# Patient Record
Sex: FEMALE | Race: BLACK OR AFRICAN AMERICAN | Employment: UNEMPLOYED | URBAN - METROPOLITAN AREA
[De-identification: names, ages, dates, MRNs, and addresses within clinical notes are randomized per-mention and may not be internally consistent; named-entity substitution may affect disease eponyms.]

---

## 2017-09-27 ENCOUNTER — HOSPITAL ENCOUNTER (OUTPATIENT)
Dept: PHYSICAL THERAPY | Age: 42
Discharge: HOME | End: 2017-09-27
Payer: COMMERCIAL

## 2017-09-27 PROCEDURE — 97161 PT EVAL LOW COMPLEX 20 MIN: CPT

## 2017-09-27 PROCEDURE — 97110 THERAPEUTIC EXERCISES: CPT

## 2017-09-27 PROCEDURE — 97014 ELECTRIC STIMULATION THERAPY: CPT

## 2017-09-27 NOTE — PROGRESS NOTES
[address] [facility] AT [address], [address] [place] [address]  Phone: [phone]   [phone]  PLAN OF CARE / [address] [place] [address] PHYSICAL THERAPY SERVICES  Patient Name: Vance Bazan : 1975   Medical   Diagnosis: Neck pain [M54.2]  Back pain [M54.9] Treatment Diagnosis: Neck pain [M54.2]  Back pain [M54.9]   Onset Date: 9/10/17     Referral Source: Unknown, Provider, MD Start of Care [facility]): 2017   Prior Hospitalization: See medical history Provider #: [id number]   Prior Level of Function: I with ADL's   Comorbidities: Asthma   Medications: Verified on Patient Summary List   The Plan of Care and following information is based on the information from the initial evaluation.   ===========================================================================================  Assessment / key information:  Pt demonstrated moderate FHRS posture in sitting. Decreased AROM of the C/S and L/S in all directions with pain, limited approx 75%. Pt was unable to tolerate exam in full and strength testing was deferred due to high pain levels. Patient demonstrated moderate TTP in the BL C/S paraspinals, and UT, as well as BL L/S paraspinals. Sensation intact to the BL UE and LE to light touch. UE AROM decreased in all directions by 50% with pain in overhead directions. Pt amb with decreased trunk rotations and moderate decreae in kirti. Will continue to evaluate patient as pain decreases and tolerance level is increased. The patient was instructed in a home exercise program to address the above findings/deficits.   Pt will benefit from PT interventions to address the aforementioned deficits and allow pt to return to PLOF.      ===========================================================================================  History LOW Complexity : Zero comorbidities / personal factors that will impact the outcome / POC; Examination HIGH Complexity : 4+ Standardized tests and measures addressing body structure, function, activity limitation and / or participation in recreation ; Presentation MEDIUM Complexity : Evolving with changing characteristics ; Decision Making MEDIUM Complexity : FOTO score of 26-74; Complexity LOW   Problem List: pain affecting function, decrease ROM, decrease strength, impaired gait/ balance, decrease ADL/ functional abilitiies, decrease activity tolerance, decrease flexibility/ joint mobility and decrease transfer abilities   Treatment Plan may include any combination of the following: Therapeutic exercise, Therapeutic activities, Neuromuscular re-education, Physical agent/modality, Gait/balance training, Manual therapy, Patient education, Functional mobility training and Stair training  Patient / Family readiness to learn indicated by: asking questions, trying to perform skills and interest  Persons(s) to be included in education: patient (P)  Barriers to Learning/Limitations: None  Measures taken:    Patient Goal (s): Improve pain and movement   Patient self reported health status: good  Rehabilitation Potential: good   Short Term Goals: To be accomplished in  1-2  weeks:  1. Pt to demonstrate independence with HEP to improve c/s AROM and posture for ADLs. 2. Pt to demonstrate improved posture >50% of time in PT to improve axial stability for ADLs.  Long Term Goals: To be accomplished in  8-12  treatments:  1. Improve FOTO outcome score by 10-15% in order to indicate a significant improvement in ADL function. 2. Pt to report +5 or > on GROC to improve functional mobility for ADLs. 3. Pt to demonstrate l/s and c/s  AROM WFL to improve functional mobility for ADLs. 4. Pt will report 75% overall improvement in functional ADL's in order to return to PLOF. 5. Patient will be independent with long term HEP in order to prepare for DC to home.       Frequency / Duration:   Patient to be seen  2  times per week for 12 treatments:  Patient / Caregiver education and instruction: exercises  G-Codes (GP): MAJO  Therapist Signature: Jimena Sanabria, PT Date: 4/42/7311   Certification Period: NA Time: 11:17 AM   ===========================================================================================  I certify that the above Physical Therapy Services are being furnished while the patient is under my care. I agree with the treatment plan and certify that this therapy is necessary. Physician Signature:        Date:       Time:     Please sign and return to [facility] or you may fax the signed copy to [phone]. Thank you.
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
## 2017-10-03 ENCOUNTER — HOSPITAL ENCOUNTER (OUTPATIENT)
Dept: PHYSICAL THERAPY | Age: 42
Discharge: HOME | End: 2017-10-03
Payer: COMMERCIAL

## 2017-10-03 PROCEDURE — 97014 ELECTRIC STIMULATION THERAPY: CPT

## 2017-10-03 PROCEDURE — 97110 THERAPEUTIC EXERCISES: CPT

## 2017-10-03 NOTE — PROGRESS NOTES
PT DAILY TREATMENT NOTE 814    Patient Name: Srini Congress  Date:10/3/2017  : 1975  [x]  Patient  Verified  Payor: BLUE CROSS / Plan: [id number] PPO / Product Type: PPO /    In time:7:30 Out time:8:31  Total Treatment Time (min): 61  Total Timed Codes (min): 61  1:1 Treatment Time (min):    Visit #: 2 of 12    Treatment Area: Neck pain [M54.2]  Back pain [M54.9]    SUBJECTIVE  Pain Level (0-10 scale): 6/10  Any medication changes, allergies to medications, adverse drug reactions, diagnosis change, or new procedure performed?: [x] No    [] Yes (see summary sheet for update)  Subjective functional status/changes:   [] No changes reported  I feel the pain more in the middle of my neck and lower back and it gets worse if I turn my body, reach with my right arm or bend forward too much.     OBJECTIVE  Modality rationale: decrease pain and increase tissue extensibility to improve the patients ability to improve functional abilities   Min Type Additional Details   15 [x] Estim: []Att   [x]Unatt        []TENS instruct                  []IFC  [x]Premod   []NMES                     []Other:  []w/US   []w/ice   [x]w/heat  Position:reclined   Location:cervical/lumbar    []  Traction: [] Cervical       []Lumbar                       [] Prone          []Supine                       []Intermittent   []Continuous Lbs:  [] before manual  [] after manual    []  Ultrasound: []Continuous   [] Pulsed                           []1MHz   []3MHz Location:  W/cm2:    []  Iontophoresis with dexamethasone         Location: [] Take home patch   [] In clinic    []  Ice     []  heat  []  Ice massage Position:  Location:    []  Vasopneumatic Device Pressure:       [] lo [] med [] hi   Temperature: [] lo [] med [] hi   [] Skin assessment post-treatment:  []intact []redness- no adverse reaction       []redness  adverse reaction:       46 min Therapeutic Exercise:  [x] See flow sheet :   Rationale: increase ROM and increase strength to improve the patients ability to improve functional abilities           min Patient Education: [x] Review HEP    [] Progressed/Changed HEP based on:   [] positioning   [] body mechanics   [] transfers   [] heat/ice application        Other Objective/Functional Measures:     Pain Level (0-10 scale) post treatment: 0    ASSESSMENT/Changes in Function: Pt tolerated all new therex fairly well upon trial today with chief c/o centralized cervical and lumbar pain with increased trunk rotation, forward flexion and R>L UE reaching type activities. Pt did not demonstrate any directional preference during treatment today, but needs most focus on postural awareness/strengthening and cervical and lumbar mobility. Will continue to progress/advance patient within current POC as tolerated with monitoring symptoms. Patient will continue to benefit from skilled PT services to modify and progress therapeutic interventions, address functional mobility deficits, address ROM deficits, address strength deficits, analyze and cue movement patterns, analyze and modify body mechanics/ergonomics and assess and modify postural abnormalities to attain remaining goals.      []  See Plan of Care  []  See progress note/recertification  []  See Discharge Summary         Progress towards goals / Updated goals:      PLAN  [x]  Upgrade activities as tolerated     []  Continue plan of care  []  Update interventions per flow sheet       []  Discharge due to:_  []  Other:_      Cira Friend PTA 10/3/2017  7:27 AM

## 2017-10-04 ENCOUNTER — HOSPITAL ENCOUNTER (OUTPATIENT)
Dept: PHYSICAL THERAPY | Age: 42
Discharge: HOME | End: 2017-10-04
Payer: COMMERCIAL

## 2017-10-04 PROCEDURE — 97110 THERAPEUTIC EXERCISES: CPT

## 2017-10-04 PROCEDURE — 97014 ELECTRIC STIMULATION THERAPY: CPT

## 2017-10-04 NOTE — PROGRESS NOTES
PT DAILY TREATMENT NOTE 8-14    Patient Name: Vincent Patrick  Date:10/4/2017  : 1975  [x]  Patient  Verified  Payor: BLUE CROSS / Plan: [facility] PPO / Product Type: PPO /    In time:4:38  Out time:5:42  Total Treatment Time (min): 64  Total Timed Codes (min): 64  1:1 Treatment Time (min):    Visit #: 3 of 12    Treatment Area: Neck pain [M54.2]  Back pain [M54.9]    SUBJECTIVE  Pain Level (0-10 scale): 5-6/10  Any medication changes, allergies to medications, adverse drug reactions, diagnosis change, or new procedure performed?: [x] No    [] Yes (see summary sheet for update)  Subjective functional status/changes:   [] No changes reported  My lower back has been hurting more on and off throughout the day at work, I am thinking that it has something to do with my chair at work.     OBJECTIVE  Modality rationale: decrease pain and increase tissue extensibility to improve the patients ability to improve functional abilities   Min Type Additional Details   15 [x] Estim: []Att   [x]Unatt        []TENS instruct                  []IFC  [x]Premod   []NMES                     []Other:  []w/US   []w/ice   [x]w/heat  Position: supine  Location:cervical/lumbar    []  Traction: [] Cervical       []Lumbar                       [] Prone          []Supine                       []Intermittent   []Continuous Lbs:  [] before manual  [] after manual    []  Ultrasound: []Continuous   [] Pulsed                           []1MHz   []3MHz Location:  W/cm2:    []  Iontophoresis with dexamethasone         Location: [] Take home patch   [] In clinic    []  Ice     []  heat  []  Ice massage Position:  Location:    []  Vasopneumatic Device Pressure:       [] lo [] med [] hi   Temperature: [] lo [] med [] hi   [] Skin assessment post-treatment:  []intact []redness- no adverse reaction       []redness  adverse reaction:       49 min Therapeutic Exercise:  [x] See flow sheet :   Rationale: increase ROM and increase strength to improve the patients ability to improve functional abilities           min Patient Education: [x] Review HEP    [] Progressed/Changed HEP based on:   [] positioning   [] body mechanics   [] transfers   [] heat/ice application        Other Objective/Functional Measures:     Pain Level (0-10 scale) post treatment: 3/10    ASSESSMENT/Changes in Function: Pt presents with chief c/o increased lumbar pain with prolonged sitting with work duties before treatment today. Pt was educated on proper sitting posture/desk ergonomics with hand outs given today. pt was also advanced to theraband postural strengthening exercises and stretching today as well. Will continue to progress/advance patient within current POC as tolerated with monitoring symptoms. Patient will continue to benefit from skilled PT services to modify and progress therapeutic interventions, address functional mobility deficits, address ROM deficits, address strength deficits, analyze and cue movement patterns, analyze and modify body mechanics/ergonomics and assess and modify postural abnormalities to attain remaining goals.      []  See Plan of Care  []  See progress note/recertification  []  See Discharge Summary         Progress towards goals / Updated goals:      PLAN  [x]  Upgrade activities as tolerated     []  Continue plan of care  []  Update interventions per flow sheet       []  Discharge due to:_  []  Other:_      Leon Jacobsen, RACHEL 10/4/2017  4:02 PM

## 2017-10-13 ENCOUNTER — HOSPITAL ENCOUNTER (OUTPATIENT)
Dept: PHYSICAL THERAPY | Age: 42
Discharge: HOME | End: 2017-10-13
Payer: COMMERCIAL

## 2017-10-13 PROCEDURE — 97014 ELECTRIC STIMULATION THERAPY: CPT

## 2017-10-13 PROCEDURE — 97110 THERAPEUTIC EXERCISES: CPT

## 2017-10-13 PROCEDURE — 97140 MANUAL THERAPY 1/> REGIONS: CPT

## 2017-10-13 NOTE — PROGRESS NOTES
PT DAILY TREATMENT NOTE     Patient Name: Bing Dang  Date:10/13/2017  : 1975  [x]  Patient  Verified  Payor: BLUE CROSS / Plan: [facility] PPO / Product Type: PPO /    In time:3:00  Out time:4:05  Total Treatment Time (min): 65  Total Timed Codes (min): 65  1:1 Treatment Time (min):    Visit #: 4 of 12    Treatment Area: Neck pain [M54.2]  Back pain [M54.9]    SUBJECTIVE  Pain Level (0-10 scale): 5 lumbar, 7 neck  Any medication changes, allergies to medications, adverse drug reactions, diagnosis change, or new procedure performed?: [x] No    [] Yes (see summary sheet for update)  Subjective functional status/changes:   [] No changes reported  I'm pretty sore right now coming off of work, but I try to move around during the day    OBJECTIVE  Modality rationale: decrease pain and increase tissue extensibility to improve the patients ability to change and maintain body/trunk positions   Min Type Additional Details   12 [x] Estim: []Att   [x]Unatt        []TENS instruct                  []IFC  [x]Premod   []NMES                     []Other:  []w/US   []w/ice   [x]w/heat  Position:  Location: Cervical, lumbar    []  Traction: [] Cervical       []Lumbar                       [] Prone          []Supine                       []Intermittent   []Continuous Lbs:  [] before manual  [] after manual    []  Ultrasound: []Continuous   [] Pulsed                           []1MHz   []3MHz Location:  W/cm2:    []  Iontophoresis with dexamethasone         Location: [] Take home patch   [] In clinic    []  Ice     []  heat  []  Ice massage Position:  Location:    []  Vasopneumatic Device Pressure:       [] lo [] med [] hi   Temperature: [] lo [] med [] hi   [] Skin assessment post-treatment:  []intact []redness- no adverse reaction       []redness  adverse reaction:       53 min Therapeutic Exercise:  [] See flow sheet :   Rationale: increase ROM and increase strength to improve the patients ability to change and maintain body/trunk positions    Pain Level (0-10 scale) post treatment: 4    ASSESSMENT/Changes in Function: Pt demonstrates moderate AROM/mobility limitations throughout upper to lower spine with spasm noted. HEP reviewed as well as recommendations for increased frequency of movements and position changes throughout work day. Patient will continue to benefit from skilled PT services to modify and progress therapeutic interventions, address ROM deficits and analyze and address soft tissue restrictions to attain remaining goals.      []  See Plan of Care  []  See progress note/recertification  []  See Discharge Summary         Progress towards goals / Updated goals:  GOAL: Pt to demonstrate l/s and c/s  AROM WFL to improve functional mobility for ADLs- Not met, progressing    PLAN  []  Upgrade activities as tolerated     [x]  Continue plan of care  []  Update interventions per flow sheet       []  Discharge due to:_  []  Other:_      Radha Hightower, PT 10/13/2017  3:56 PM

## 2017-10-16 ENCOUNTER — HOSPITAL ENCOUNTER (OUTPATIENT)
Dept: PHYSICAL THERAPY | Age: 42
Discharge: HOME | End: 2017-10-16
Payer: COMMERCIAL

## 2017-10-16 PROCEDURE — 97140 MANUAL THERAPY 1/> REGIONS: CPT

## 2017-10-16 PROCEDURE — 97110 THERAPEUTIC EXERCISES: CPT

## 2017-10-16 PROCEDURE — 97014 ELECTRIC STIMULATION THERAPY: CPT

## 2017-10-16 NOTE — PROGRESS NOTES
PT DAILY TREATMENT NOTE     Patient Name: Leo Lawson  Date:10/16/2017  : 1975  [x]  Patient  Verified  Payor: BLUE CROSS / Plan: [employer] [id number] PPO / Product Type: PPO /    In time:5:25  Out time:6:28  Total Treatment Time (min): 63  Total Timed Codes (min): 63  1:1 Treatment Time (min):    Visit #: 5 of 12    Treatment Area: Neck pain [M54.2]  Back pain [M54.9]    SUBJECTIVE  Pain Level (0-10 scale): Cervical=8/10; Lumbar=4/10  Any medication changes, allergies to medications, adverse drug reactions, diagnosis change, or new procedure performed?: [x] No    [] Yes (see summary sheet for update)  Subjective functional status/changes:   [] No changes reported  My neck has been hurting more as well as my shoulder blades when I reach forward with either arm as well as when I turn my head either way.     OBJECTIVE  Modality rationale: decrease pain and increase tissue extensibility to improve the patients ability to improve functional abilities   Min Type Additional Details   12 [x] Estim: []Att   [x]Unatt        []TENS instruct                  []IFC  [x]Premod   []NMES                     []Other:  []w/US   []w/ice   [x]w/heat  Position:reclined  Location:cervical/lumbar    []  Traction: [] Cervical       []Lumbar                       [] Prone          []Supine                       []Intermittent   []Continuous Lbs:  [] before manual  [] after manual    []  Ultrasound: []Continuous   [] Pulsed                           []1MHz   []3MHz Location:  W/cm2:    []  Iontophoresis with dexamethasone         Location: [] Take home patch   [] In clinic    []  Ice     []  heat  []  Ice massage Position:  Location:    []  Vasopneumatic Device Pressure:       [] lo [] med [] hi   Temperature: [] lo [] med [] hi   [] Skin assessment post-treatment:  []intact []redness- no adverse reaction       []redness  adverse reaction:       41 min Therapeutic Exercise:  [x] See flow sheet :   Rationale: increase ROM and increase strength to improve the patients ability to improve functional abilities    10 min Manual Therapy:  SOR, MET to B upper traps and PROM cervical rotation stretching    Rationale: decrease pain, increase ROM and increase tissue extensibility to improve functional mobility           min Patient Education: [x] Review HEP    [] Progressed/Changed HEP based on:   [] positioning   [] body mechanics   [] transfers   [] heat/ice application        Other Objective/Functional Measures:     Pain Level (0-10 scale) post treatment: C-spine=6/10; Lumbar=0    ASSESSMENT/Changes in Function: Pt presented with chief c/o increased cervical>lumbar pain/sxs with increased forward flexion/UE reaching ADL's as well as active cervical rotation. Pt was moderately guarded with cervical AROM as well as bed mobility during treatment. Pt also presented with moderate guarding, but increased benefit from addition on manual intervention to cervical spine especially with side bending and rotation. Will continue to progress/advance patient within current POC as tolerated with monitoring symptoms. Patient will continue to benefit from skilled PT services to modify and progress therapeutic interventions, address functional mobility deficits, address ROM deficits, address strength deficits, analyze and cue movement patterns, analyze and modify body mechanics/ergonomics and assess and modify postural abnormalities to attain remaining goals.      []  See Plan of Care  []  See progress note/recertification  []  See Discharge Summary         Progress towards goals / Updated goals:      PLAN  [x]  Upgrade activities as tolerated     []  Continue plan of care  []  Update interventions per flow sheet       []  Discharge due to:_  []  Other:_      Dawson Lerma PTA 10/16/2017  5:37 PM

## 2017-10-23 ENCOUNTER — APPOINTMENT (OUTPATIENT)
Dept: PHYSICAL THERAPY | Age: 42
End: 2017-10-23
Payer: COMMERCIAL

## 2017-11-01 ENCOUNTER — HOSPITAL ENCOUNTER (OUTPATIENT)
Dept: PHYSICAL THERAPY | Age: 42
Discharge: HOME | End: 2017-11-01
Payer: COMMERCIAL

## 2017-11-01 PROCEDURE — 97110 THERAPEUTIC EXERCISES: CPT

## 2017-11-01 PROCEDURE — 97140 MANUAL THERAPY 1/> REGIONS: CPT

## 2017-11-01 NOTE — PROGRESS NOTES
[facility] AT [place] [address], [place], [address] [place] [address] Road  Phone: [phone]   [phone]  PROGRESS NOTE  Patient Name: Otilio Navarrete : 1975   Treatment/Medical Diagnosis: Neck pain [M54.2]  Back pain [M54.9]   Referral Source: URSULA Iverson     Date of Initial Visit: 17 Attended Visits: 6 Missed Visits: 0     SUMMARY OF TREATMENT  Therapeutic exercise for cervical/lumbar mobility, postural awareness/strengthening, cervicothoracic and lumbopelvic stabilization, manual intervention, electrical stimulation with moist heat and HEP. CURRENT STATUS  Patient reports approximately 50% (cervical) and 75% (lumbar) overall improvement from therapy  since initial evaluation with 7/10 cervical and 4/10 lumbar pain on average, increased to 8/10 cervical pain at the worst with prolonged R UE forward and overhead reaching activity, 6/10 lumbar pain at the worst with prolonged sitting or standing ADL's. Pt was making good progress up until being hospitalized recently for asthma attack symptoms for 9 days with returning again for treatment today. Pt would benefit from continued therapy to resume current progression with therapy where she left off from being hospitalized. Will continue to progress/advance patient within current POC as tolerated with monitoring symptoms. Cervical AROM= Flexion=23 degrees; Extension=35 degrees; Side bending= 45 degrees bilaterally; Rotation= R=60 degrees, L=68 degrees  Lumbar AROM= Flexion= full; Extension=50%; Side bending= R=80%, L=60%  Goal/Measure of Progress Goal Met? 1. Improve FOTO outcome score by 10-15% in order to indicate a significant improvement in ADL function. Status at last Eval:  Current Status: 40/100 yes   2. Pt to report +5 or > on GROC to improve functional mobility for ADLs   Status at last Eval: Progressing Current Status: Current GROC score= Cervical= -1; Lumbar= +2 no   3.   Pt to demonstrate l/s and c/s  AROM WFL to improve functional mobility for ADLs. Status at last Eval: Progressing Current Status: Improved, but Not Met no   4. Pt will report 75% overall improvement in functional ADL's in order to return to PLOF. Status at last Eval: Progressing Current Status: Partially Met, Lumbar improvement met, Cervical improvement Not Met Partially Met     New Goals to be achieved in __8__  treatments:  1. Pt to report +5 or > on GROC to improve functional mobility for ADLs   2. Pt to demonstrate l/s and c/s  AROM WFL to improve functional mobility for ADLs   3. Pt will report 75% overall improvement in functional ADL's in order to return to PLOF. 4.  Patient will be independent with long term HEP in order to prepare for DC to home. RECOMMENDATIONS  Continue with current POC for 4 remaining visits left on current script, plus 4 additional visits (8 total visits) with advancing as tolerated, then reassess for the need for continuation or discharge from therapy. If you have any questions/comments please contact us directly at [phone]. Thank you for allowing us to assist in the care of your patient. LPTA Signature: Naveen Ahn, RACHEL  Date: 11/1/2017   PT Signature: Wandy Pacheco PT Time: 4:47 PM   NOTE TO PHYSICIAN:  PLEASE COMPLETE THE ORDERS BELOW AND FAX TO   [facility]: [phone]. If you are unable to process this request in 24 hours please contact our office: [phone].    ___ I have read the above report and request that my patient continue as recommended.   ___ I have read the above report and request that my patient continue therapy with the following changes/special instructions:_________________________________________________________   ___ I have read the above report and request that my patient be discharged from therapy.      Physician Signature:        Date:       Time:

## 2017-11-01 NOTE — PROGRESS NOTES
PT DAILY TREATMENT NOTE 8-    Patient Name: Bing Dang  Date:2017  : 1975  [x]  Patient  Verified  Payor: BLUE CROSS / Plan: [employer] [id number] PPO / Product Type: PPO /    In time:4:40  Out time:5:42  Total Treatment Time (min): 62  Total Timed Codes (min): 62  1:1 Treatment Time (min):    Visit #: 6 of 12    Treatment Area: Neck pain [M54.2]  Back pain [M54.9]    SUBJECTIVE  Pain Level (0-10 scale): R cervical/shoulder= 8/10; Lumbar=0  Any medication changes, allergies to medications, adverse drug reactions, diagnosis change, or new procedure performed?: [x] No    [] Yes (see summary sheet for update)  Subjective functional status/changes:   [] No changes reported  My neck and right shoulder has been killing me especially when I do anything where I have to reach forward or up with that arm, but my lower back has been feeling pretty good. OBJECTIVE      52 min Therapeutic Exercise:  [x] See flow sheet :   Rationale: increase ROM and increase strength to improve the patients ability to improve functional abilities    10 min Manual Therapy:  DTM/tissue mobs to R upper trap/levator scapulae    Rationale: increase ROM, increase tissue extensibility and decrease trigger points to improve functional mobility           min Patient Education: [x] Review HEP    [] Progressed/Changed HEP based on:   [] positioning   [] body mechanics   [] transfers   [] heat/ice application        Other Objective/Functional Measures:     Pain Level (0-10 scale) post treatment: 0    ASSESSMENT/Changes in Function:     Patient will continue to benefit from skilled PT services to modify and progress therapeutic interventions, address functional mobility deficits, address ROM deficits, address strength deficits, analyze and cue movement patterns, analyze and modify body mechanics/ergonomics and assess and modify postural abnormalities to attain remaining goals.      []  See Plan of Care  [x]  See progress note/recertification  []  See Discharge Summary         Progress towards goals / Updated goals:  See Progress note/Physician update for full detailed progress towards established goals.     PLAN  [x]  Upgrade activities as tolerated     []  Continue plan of care  []  Update interventions per flow sheet       []  Discharge due to:_  []  Other:_      Pema Beltrán, RACHEL 11/1/2017  4:38 PM

## 2017-11-06 ENCOUNTER — APPOINTMENT (OUTPATIENT)
Dept: PHYSICAL THERAPY | Age: 42
End: 2017-11-06
Payer: COMMERCIAL

## 2017-11-08 ENCOUNTER — HOSPITAL ENCOUNTER (OUTPATIENT)
Dept: PHYSICAL THERAPY | Age: 42
Discharge: HOME | End: 2017-11-08
Payer: COMMERCIAL

## 2017-11-08 PROCEDURE — 97140 MANUAL THERAPY 1/> REGIONS: CPT

## 2017-11-08 PROCEDURE — 97110 THERAPEUTIC EXERCISES: CPT

## 2017-11-08 NOTE — PROGRESS NOTES
PT DAILY TREATMENT NOTE     Patient Name: Damon Erickson  Date:2017  : 1975  [x]  Patient  Verified  Payor: BLUE DONOVAN / Plan: [employer]ckin[employer] [id number] PPO / Product Type: PPO /    In time:4:41  Out time:5:28  Total Treatment Time (min): 47  Total Timed Codes (min): 47  1:1 Treatment Time (min):    Visit #: 7 of 12    Treatment Area: Neck pain [M54.2]  Back pain [M54.9]    SUBJECTIVE  Pain Level (0-10 scale): Cervical=7/10; Lumbar=0  Any medication changes, allergies to medications, adverse drug reactions, diagnosis change, or new procedure performed?: [x] No    [] Yes (see summary sheet for update)  Subjective functional status/changes:   [] No changes reported  My neck has still been hurting, but my lower back has been doing pretty good. My neck has still been hurting the most on the right side going down to my shoulder blade. OBJECTIVE      37 min Therapeutic Exercise:  [x] See flow sheet :   Rationale: increase ROM and increase strength to improve the patients ability to improve functional abilities    10 min Manual Therapy:   DTM/tissue mobs to R upper trap/levator scapulae    Rationale: decrease pain, increase ROM, increase tissue extensibility and decrease trigger points to improve functional mobility        min Patient Education: [x] Review HEP    [] Progressed/Changed HEP based on:   [] positioning   [] body mechanics   [] transfers   [] heat/ice application        Other Objective/Functional Measures:     Pain Level (0-10 scale) post treatment: 0    ASSESSMENT/Changes in Function: Pt presents with chief c/o R cervical and scapular pain/sxs, but diminished lumbar symptoms since last tx. Will continue to progress/advance patient within current POC as tolerated with monitoring symptoms.     Patient will continue to benefit from skilled PT services to modify and progress therapeutic interventions, address functional mobility deficits, address ROM deficits, address strength deficits, analyze and address soft tissue restrictions, analyze and cue movement patterns, analyze and modify body mechanics/ergonomics and assess and modify postural abnormalities to attain remaining goals.      []  See Plan of Care  []  See progress note/recertification  []  See Discharge Summary         Progress towards goals / Updated goals:      PLAN  [x]  Upgrade activities as tolerated     []  Continue plan of care  []  Update interventions per flow sheet       []  Discharge due to:_  []  Other:_      Andrew Neal, RACHEL 11/8/2017  4:47 PM

## 2017-11-13 ENCOUNTER — HOSPITAL ENCOUNTER (OUTPATIENT)
Dept: PHYSICAL THERAPY | Age: 42
Discharge: HOME | End: 2017-11-13
Payer: COMMERCIAL

## 2017-11-13 PROCEDURE — 97110 THERAPEUTIC EXERCISES: CPT

## 2017-11-13 NOTE — PROGRESS NOTES
PT DAILY TREATMENT NOTE     Patient Name: Tanesha Briggs  Date:2017  : 1975  [x]  Patient  Verified  Payor: BLUE CROSS / Plan: [facility] PPO / Product Type: PPO /    In time:4:02  Out time:5:04  Total Treatment Time (min): 62  Total Timed Codes (min): 52  1:1 Treatment Time (min):    Visit #: 8 of 12    Treatment Area: Neck pain [M54.2]  Back pain [M54.9]    SUBJECTIVE  Pain Level (0-10 scale): 5/10  Any medication changes, allergies to medications, adverse drug reactions, diagnosis change, or new procedure performed?: [x] No    [] Yes (see summary sheet for update)  Subjective functional status/changes:   [] No changes reported  The middle of my back has been hurting more ever since I was here last, but I don't remember doing anything different to aggravate it the last time that I was here or since then.     OBJECTIVE  Modality rationale: decrease pain and increase tissue extensibility to improve the patients ability to improve functional abilities   Min Type Additional Details    [] Estim: []Att   []Unatt        []TENS instruct                  []IFC  []Premod   []NMES                     []Other:  []w/US   []w/ice   []w/heat  Position:  Location:    []  Traction: [] Cervical       []Lumbar                       [] Prone          []Supine                       []Intermittent   []Continuous Lbs:  [] before manual  [] after manual    []  Ultrasound: []Continuous   [] Pulsed                           []1MHz   []3MHz Location:  W/cm2:    []  Iontophoresis with dexamethasone         Location: [] Take home patch   [] In clinic   10 []  Ice     [x]  heat  []  Ice massage Position: supine  Location:cervical/lumbar    []  Vasopneumatic Device Pressure:       [] lo [] med [] hi   Temperature: [] lo [] med [] hi   [] Skin assessment post-treatment:  []intact []redness- no adverse reaction       []redness  adverse reaction:       52 min Therapeutic Exercise:  [x] See flow sheet : Rationale: increase ROM and increase strength to improve the patients ability to improve functional abilities         min Patient Education: [x] Review HEP    [] Progressed/Changed HEP based on:   [] positioning   [] body mechanics   [] transfers   [] heat/ice application        Other Objective/Functional Measures:     Pain Level (0-10 scale) post treatment: 4/10    ASSESSMENT/Changes in Function: Pt presented with new chief c/o centralized lower thoracic pain/sxs with no specific change/increase in activity since last tx. Pt reported increased relief of these sxs with addition of 3 way prayer stretches as well as ability to advance to level 2 dead big stabs with min to mod challenge today. Will continue to progress/advance patient within current POC as tolerated with monitoring symptoms. Patient will continue to benefit from skilled PT services to modify and progress therapeutic interventions, address functional mobility deficits, address ROM deficits, address strength deficits, analyze and cue movement patterns, analyze and modify body mechanics/ergonomics and assess and modify postural abnormalities to attain remaining goals.      []  See Plan of Care  []  See progress note/recertification  []  See Discharge Summary         Progress towards goals / Updated goals:      PLAN  [x]  Upgrade activities as tolerated     []  Continue plan of care  []  Update interventions per flow sheet       []  Discharge due to:_  []  Other:_      Asiya Davis PTA 11/13/2017  4:22 PM

## 2017-11-15 ENCOUNTER — HOSPITAL ENCOUNTER (OUTPATIENT)
Dept: PHYSICAL THERAPY | Age: 42
Discharge: HOME | End: 2017-11-15
Payer: COMMERCIAL

## 2017-11-15 PROCEDURE — 97110 THERAPEUTIC EXERCISES: CPT

## 2017-11-15 NOTE — PROGRESS NOTES
PT DAILY TREATMENT NOTE     Patient Name: Yusra Hart  Date:11/15/2017  : 1975  [x]  Patient  Verified  Payor: BLUE DONOVAN / Plan: [employer] [id number] PPO / Product Type: PPO /    In time:5:08  Out time:6:05  Total Treatment Time (min): 57  Total Timed Codes (min): 47  1:1 Treatment Time (min):    Visit #: 9 of 12    Treatment Area: Neck pain [M54.2]  Back pain [M54.9]    SUBJECTIVE  Pain Level (0-10 scale): Cervical=6.5/10; Lumbar=4.5/10  Any medication changes, allergies to medications, adverse drug reactions, diagnosis change, or new procedure performed?: [x] No    [] Yes (see summary sheet for update)  Subjective functional status/changes:   [] No changes reported  My neck and back feels about the same, but my right shoulder has been bothering me more. I think that the shoulder pain is unrelated pain due to my lupus more than anything lately.     OBJECTIVE  Modality rationale: decrease pain and increase tissue extensibility to improve the patients ability to improve functional abilities   Min Type Additional Details    [] Estim: []Att   []Unatt        []TENS instruct                  []IFC  []Premod   []NMES                     []Other:  []w/US   []w/ice   []w/heat  Position:  Location:    []  Traction: [] Cervical       []Lumbar                       [] Prone          []Supine                       []Intermittent   []Continuous Lbs:  [] before manual  [] after manual    []  Ultrasound: []Continuous   [] Pulsed                           []1MHz   []3MHz Location:  W/cm2:    []  Iontophoresis with dexamethasone         Location: [] Take home patch   [] In clinic   10 []  Ice     [x]  heat  []  Ice massage Position:supine  Location:cervical/lumbar    []  Vasopneumatic Device Pressure:       [] lo [] med [] hi   Temperature: [] lo [] med [] hi   [] Skin assessment post-treatment:  []intact []redness- no adverse reaction       []redness  adverse reaction:       47 min Therapeutic Exercise:  [x] See flow sheet :   Rationale: increase ROM and increase strength to improve the patients ability to improve functional abilities           min Patient Education: [x] Review HEP    [] Progressed/Changed HEP based on:   [] positioning   [] body mechanics   [] transfers   [] heat/ice application        Other Objective/Functional Measures:     Pain Level (0-10 scale) post treatment: 0    ASSESSMENT/Changes in Function: Pt was able to increased resistance with theraband postural strengthening exercises with min to mod challenge as well as moderate challenge with addition of cervicothoracic stabs with 1 lb due to increased R shoulder pain due to unrelated lupus joint pain/sxs today. Will continue to progress/advance patient within current POC as tolerated with monitoring symptoms. Patient will continue to benefit from skilled PT services to modify and progress therapeutic interventions, address functional mobility deficits, address ROM deficits, address strength deficits, analyze and cue movement patterns, analyze and modify body mechanics/ergonomics and assess and modify postural abnormalities to attain remaining goals.      []  See Plan of Care  []  See progress note/recertification  []  See Discharge Summary         Progress towards goals / Updated goals:      PLAN  [x]  Upgrade activities as tolerated     []  Continue plan of care  []  Update interventions per flow sheet       []  Discharge due to:_  []  Other:_      Chelsey Saravia, RACHEL 11/15/2017  5:10 PM

## 2017-11-20 ENCOUNTER — APPOINTMENT (OUTPATIENT)
Dept: PHYSICAL THERAPY | Age: 42
End: 2017-11-20
Payer: COMMERCIAL

## 2017-11-22 ENCOUNTER — HOSPITAL ENCOUNTER (OUTPATIENT)
Dept: PHYSICAL THERAPY | Age: 42
Discharge: HOME | End: 2017-11-22
Payer: COMMERCIAL

## 2017-11-22 PROCEDURE — 97110 THERAPEUTIC EXERCISES: CPT

## 2017-11-22 NOTE — PROGRESS NOTES
PT DAILY TREATMENT NOTE     Patient Name: Thais Dooley  Date:2017  : 1975  [x]  Patient  Verified  Payor: BLUE CROSS / Plan: [facility] PPO / Product Type: PPO /    In time: 435  Out time: 535  Total Treatment Time (min): 60  Total Timed Codes (min): 50  1:1 Treatment Time (min):    Visit #: 10 of 12    Treatment Area: Neck pain [M54.2]  Back pain [M54.9]    SUBJECTIVE  Pain Level (0-10 scale): Cervical=6.5/10; Lumbar=4.5/10  Any medication changes, allergies to medications, adverse drug reactions, diagnosis change, or new procedure performed?: [x] No    [] Yes (see summary sheet for update)  Subjective functional status/changes:   [] No changes reported  Pt reports ongoing pain to right shoulder, neck and low back.       OBJECTIVE  Modality rationale: decrease pain and increase tissue extensibility to improve the patients ability to improve functional abilities   Min Type Additional Details    [] Estim: []Att   []Unatt        []TENS instruct                  []IFC  []Premod   []NMES                     []Other:  []w/US   []w/ice   []w/heat  Position:  Location:    []  Traction: [] Cervical       []Lumbar                       [] Prone          []Supine                       []Intermittent   []Continuous Lbs:  [] before manual  [] after manual    []  Ultrasound: []Continuous   [] Pulsed                           []1MHz   []3MHz Location:  W/cm2:    []  Iontophoresis with dexamethasone         Location: [] Take home patch   [] In clinic   10 []  Ice     [x]  heat  []  Ice massage Position:supine  Location:cervical/lumbar    []  Vasopneumatic Device Pressure:       [] lo [] med [] hi   Temperature: [] lo [] med [] hi   [] Skin assessment post-treatment:  []intact []redness- no adverse reaction       []redness  adverse reaction:       50 min Therapeutic Exercise:  [x] See flow sheet :   Rationale: increase ROM and increase strength to improve the patients ability to improve functional abilities           min Patient Education: [x] Review HEP    [] Progressed/Changed HEP based on:   [] positioning   [] body mechanics   [] transfers   [] heat/ice application        Other Objective/Functional Measures:     Pain Level (0-10 scale) post treatment: 0    ASSESSMENT/Changes in Function: Pt given vc/mc's for reduced genu recurvatum and increased postural control with cervico-thoracic stabs at wall; poor stability and need for f/u training. Pt primary c/o pain to right LS/UT region today and significant limitations to cervical extension with pt demonstrating primarily upper c/s extension and no mid/lower c/s extension. Added c/s retraction to address and promote lower c/s stability towards neutral posture. Pt with no pain c/o and ed on postural corrections for [facility] throughout the day to tolerance. Patient will continue to benefit from skilled PT services to modify and progress therapeutic interventions, address functional mobility deficits, address ROM deficits, address strength deficits, analyze and cue movement patterns, analyze and modify body mechanics/ergonomics and assess and modify postural abnormalities to attain remaining goals. []  See Plan of Care  []  See progress note/recertification  []  See Discharge Summary         Progress towards goals / Updated goals:  Current goals in progress    PLAN  [x]  Upgrade activities as tolerated     []  Continue plan of care  []  Update interventions per flow sheet       []  Discharge due to:_  []  Other:_      Crystal Banegas, PT 11/22/2017  5:10 PM

## 2017-11-27 ENCOUNTER — HOSPITAL ENCOUNTER (OUTPATIENT)
Dept: PHYSICAL THERAPY | Age: 42
Discharge: HOME | End: 2017-11-27
Payer: COMMERCIAL

## 2017-11-27 PROCEDURE — 97110 THERAPEUTIC EXERCISES: CPT

## 2017-11-27 NOTE — PROGRESS NOTES
PT DAILY TREATMENT NOTE     Patient Name: Jennifer Salvador  Date:2017  : 1975  [x]  Patient  Verified  Payor: BLUE DONOVAN / Plan: [employer] [id number] PPO / Product Type: PPO /    In time:5:35  Out time:6:21  Total Treatment Time (min): 46  Total Timed Codes (min): 36  1:1 Treatment Time (min):    Visit #: 11  14    Treatment Area: Neck pain [M54.2]  Back pain [M54.9]    SUBJECTIVE  Pain Level (0-10 scale): 6/10 R cervical/shoulder  Any medication changes, allergies to medications, adverse drug reactions, diagnosis change, or new procedure performed?: [x] No    [] Yes (see summary sheet for update)  Subjective functional status/changes:   [] No changes reported  My lower back has been feeling pretty good, but the right side of my neck and shoulder has been hurting a lot even when I raise my arm up certain ways and it is different than the lupus pain that I was having last week.     OBJECTIVE  Modality rationale: decrease pain and increase tissue extensibility to improve the patients ability to improve functional abilities   Min Type Additional Details    [] Estim: []Att   []Unatt        []TENS instruct                  []IFC  []Premod   []NMES                     []Other:  []w/US   []w/ice   []w/heat  Position:  Location:    []  Traction: [] Cervical       []Lumbar                       [] Prone          []Supine                       []Intermittent   []Continuous Lbs:  [] before manual  [] after manual    []  Ultrasound: []Continuous   [] Pulsed                           []1MHz   []3MHz Location:  W/cm2:    []  Iontophoresis with dexamethasone         Location: [] Take home patch   [] In clinic   10 []  Ice     [x]  heat  []  Ice massage Position:long sitting  Location:R upper trap and lumbar    []  Vasopneumatic Device Pressure:       [] lo [] med [] hi   Temperature: [] lo [] med [] hi   [] Skin assessment post-treatment:  []intact []redness- no adverse reaction       []redness  adverse reaction:       36 min Therapeutic Exercise:  [x] See flow sheet :   Rationale: increase ROM and increase strength to improve the patients ability to improve functional abilities           min Patient Education: [x] Review HEP    [] Progressed/Changed HEP based on:   [] positioning   [] body mechanics   [] transfers   [] heat/ice application        Other Objective/Functional Measures:     Pain Level (0-10 scale) post treatment: C-spine/R shoulder=4/10; Lumbar=0    ASSESSMENT/Changes in Function: Pt presented with chief c/o increased R cervical and shoulder pain unrelated to lupus sxs today. Pt was unable to tolerate theraband shoulder extensions and was limited with AROM with cervicothoracic stabs with 1 lb weights today. Will continue to progress/advance patient within current POC as tolerated with monitoring symptoms. Patient will continue to benefit from skilled PT services to modify and progress therapeutic interventions, address functional mobility deficits, address ROM deficits, address strength deficits, analyze and cue movement patterns, analyze and modify body mechanics/ergonomics and assess and modify postural abnormalities to attain remaining goals.      []  See Plan of Care  []  See progress note/recertification  []  See Discharge Summary         Progress towards goals / Updated goals:      PLAN  [x]  Upgrade activities as tolerated     []  Continue plan of care  []  Update interventions per flow sheet       []  Discharge due to:_  []  Other:_      Andrew Neal, RACHEL 11/27/2017  5:54 PM

## 2017-11-29 ENCOUNTER — APPOINTMENT (OUTPATIENT)
Dept: PHYSICAL THERAPY | Age: 42
End: 2017-11-29
Payer: COMMERCIAL

## 2017-12-06 ENCOUNTER — HOSPITAL ENCOUNTER (OUTPATIENT)
Dept: PHYSICAL THERAPY | Age: 42
End: 2017-12-06
Payer: COMMERCIAL

## 2017-12-07 ENCOUNTER — HOSPITAL ENCOUNTER (OUTPATIENT)
Dept: PHYSICAL THERAPY | Age: 42
Discharge: HOME | End: 2017-12-07
Payer: COMMERCIAL

## 2017-12-07 PROCEDURE — 97110 THERAPEUTIC EXERCISES: CPT

## 2017-12-07 NOTE — PROGRESS NOTES
[facility] AT [address], [address] [place] [address] Road  Phone: [phone]   [phone]  PROGRESS NOTE  Patient Name: Mirtha Verde : 1975   Treatment/Medical Diagnosis: Neck pain [M54.2]  Back pain [M54.9]   Referral Source: URSULA Issa     Date of Initial Visit: 17 Attended Visits: 12 Missed Visits: 3 CXL  1 NS's     SUMMARY OF TREATMENT  Pt was seen on 17 for an initial evaluation for C/S and L/S pain. Pt has been seen for 12 visits and therapy has included: therapeutic exercise, manual therapy, modalities for pain control, patient education and HEP. CURRENT STATUS  Pt has been making fair progress in therapy since their IE on 17. Pt reports 95% in the Lumbar and 85% overall improvement with functional ADL's since beginning PT. Pt's pain range 6-7/10 for the neck and shoulder, 0-3/10 for the lumbar spine. Pt reports increased pain in the L/S with minimal functional deficits at this time. Pt reports current deficits include pain in the R UT and UE that causes pain into the R shoulder blade and rhomboid. Pt continues to have increased pain in the R side of the C/S and rhomboid with all motions reaching forward and out away from the body. Patient would benefit from continuation of skilled PT 2x/week with increased focus on the C/S and R shoulder/scapular region to address remaining deficits and pain. Pt continues to have moderate difficulty with decreased strength on the R UE. C/S AROM decreased by 50% in all directions, L/S AROM WNL in all directions    New Goals to be achieved in __8__  treatments:  1. Pt to report +5 or > on GROC to improve functional mobility for ADLs- will assess at DC   2. Pt to demonstrate l/s and c/s  AROM WFL to improve functional mobility for ADLs- goal progressing   3. Pt will report 75% overall improvement in functional ADL's in order to return to PLOF. - goal met   4.   Patient will be independent with long term HEP in order to prepare for DC to home. - will assess at [address] [place] [address] PT 2x/week for 6-8 additional visits with focus on the C/S and R shoulder then reassess for continuation or DC from therapy. If you have any questions/comments please contact us directly at [phone]. Thank you for allowing us to assist in the care of your patient. Therapist Signature: Emerson Kearney PT Date: 12/7/2017   Reporting Period NA Time: 12:51 PM   NOTE TO PHYSICIAN:  PLEASE COMPLETE THE ORDERS BELOW AND FAX TO   [facility] at [facility] UNIT: [phone]. If you are unable to process this request in 24 hours please contact our office: [phone].    ___ I have read the above report and request that my patient continue as recommended.   ___ I have read the above report and request that my patient continue therapy with the following changes/special instructions:_________________________________________________________   ___ I have read the above report and request that my patient be discharged from therapy.      Physician Signature:        Date:       Time:

## 2017-12-12 ENCOUNTER — HOSPITAL ENCOUNTER (OUTPATIENT)
Dept: PHYSICAL THERAPY | Age: 42
Discharge: HOME | End: 2017-12-12
Payer: COMMERCIAL

## 2017-12-12 PROCEDURE — 97110 THERAPEUTIC EXERCISES: CPT

## 2017-12-12 NOTE — PROGRESS NOTES
PT DAILY TREATMENT NOTE     Patient Name: Austen Denise  Date:2017  : 1975  [x]  Patient  Verified  Payor: BLUE CROSS / Plan: [facility] PPO / Product Type: PPO /    In time:9:14  Out time:9:56  Total Treatment Time (min): 42  Total Timed Codes (min): 32  1:1 Treatment Time (min):    Visit #:     Treatment Area: Neck pain [M54.2]  Back pain [M54.9]    SUBJECTIVE  Pain Level (0-10 scale): 7/10 c-spine and R shoulder  Any medication changes, allergies to medications, adverse drug reactions, diagnosis change, or new procedure performed?: [x] No    [] Yes (see summary sheet for update)  Subjective functional status/changes:   [] No changes reported  My neck and my left shoulder has been bothering me the most lately especially when I move my arm a lot when I am typing and driving a lot. My neck hurts the most when I turn my head to the left more than the right.     OBJECTIVE  Modality rationale: decrease pain and increase tissue extensibility to improve the patients ability to improve functional abilities   Min Type Additional Details    [] Estim: []Att   []Unatt        []TENS instruct                  []IFC  []Premod   []NMES                     []Other:  []w/US   []w/ice   []w/heat  Position:  Location:    []  Traction: [] Cervical       []Lumbar                       [] Prone          []Supine                       []Intermittent   []Continuous Lbs:  [] before manual  [] after manual    []  Ultrasound: []Continuous   [] Pulsed                           []1MHz   []3MHz Location:  W/cm2:    []  Iontophoresis with dexamethasone         Location: [] Take home patch   [] In clinic   10 []  Ice     [x]  heat  []  Ice massage Position:reclined  Location:lumbar    []  Vasopneumatic Device Pressure:       [] lo [] med [] hi   Temperature: [] lo [] med [] hi   [] Skin assessment post-treatment:  []intact []redness- no adverse reaction       []redness  adverse reaction:       32 min Therapeutic Exercise:  [x] See flow sheet :   Rationale: increase ROM and increase strength to improve the patients ability to improve functional abilities      min Patient Education: [x] Review HEP    [] Progressed/Changed HEP based on:   [] positioning   [] body mechanics   [] transfers   [] heat/ice application        Other Objective/Functional Measures:     Pain Level (0-10 scale) post treatment: 7/10    ASSESSMENT/Changes in Function: Pt presents with chief c/o continued cervical and L shoulder pain > lumbar pain/sxs especially with prolonged L>R UE use with driving and typing ADL's. Pt also presents with increased cervical pain with L>R cervical rotation and was limited with L UE AROM tolerance with inability to perform theraband shoulder extension and limited shoulder flexion AROM with cervicothoracic stabs with 1 lb. Will continue to progress/advance patient within current POC as tolerated with monitoring symptoms. Patient will continue to benefit from skilled PT services to modify and progress therapeutic interventions, address functional mobility deficits, address ROM deficits, address strength deficits, analyze and cue movement patterns, analyze and modify body mechanics/ergonomics and assess and modify postural abnormalities to attain remaining goals.      []  See Plan of Care  []  See progress note/recertification  []  See Discharge Summary         Progress towards goals / Updated goals:      PLAN  [x]  Upgrade activities as tolerated     []  Continue plan of care  []  Update interventions per flow sheet       []  Discharge due to:_  []  Other:_      Filippo Valerio, RACHEL 12/12/2017  9:14 AM

## 2017-12-13 ENCOUNTER — APPOINTMENT (OUTPATIENT)
Dept: PHYSICAL THERAPY | Age: 42
End: 2017-12-13
Payer: COMMERCIAL

## 2017-12-18 ENCOUNTER — HOSPITAL ENCOUNTER (OUTPATIENT)
Dept: PHYSICAL THERAPY | Age: 42
Discharge: HOME | End: 2017-12-18
Payer: COMMERCIAL

## 2017-12-18 PROCEDURE — 97110 THERAPEUTIC EXERCISES: CPT

## 2017-12-18 NOTE — PROGRESS NOTES
PT DAILY TREATMENT NOTE 8    Patient Name: Sherren Perry  Date:2017  : 1975  [x]  Patient  Verified  Payor: BLUE CROSS / Plan: [id number] PPO / Product Type: PPO /    In time:4:40  Out time:5:31  Total Treatment Time (min): 51  Total Timed Codes (min): 41  1:1 Treatment Time (min):    Visit #: 14 of 14    Treatment Area: Neck pain [M54.2]  Back pain [M54.9]    SUBJECTIVE  Pain Level (0-10 scale): C-spine/R UE   Any medication changes, allergies to medications, adverse drug reactions, diagnosis change, or new procedure performed?: [x] No    [] Yes (see summary sheet for update)  Subjective functional status/changes:   [] No changes reported  My neck and right arm has been bothering me more than usual lately and I haven't really even been using my arm for lifting or reaching lately.     OBJECTIVE  Modality rationale: decrease pain and increase tissue extensibility to improve the patients ability to improve functional abilities   Min Type Additional Details    [] Estim: []Att   []Unatt        []TENS instruct                  []IFC  []Premod   []NMES                     []Other:  []w/US   []w/ice   []w/heat  Position:  Location:    []  Traction: [] Cervical       []Lumbar                       [] Prone          []Supine                       []Intermittent   []Continuous Lbs:  [] before manual  [] after manual    []  Ultrasound: []Continuous   [] Pulsed                           []1MHz   []3MHz Location:  W/cm2:    []  Iontophoresis with dexamethasone         Location: [] Take home patch   [] In clinic   10 []  Ice     [x]  heat  []  Ice massage Position:reclined  Location:lumbar    []  Vasopneumatic Device Pressure:       [] lo [] med [] hi   Temperature: [] lo [] med [] hi   [] Skin assessment post-treatment:  []intact []redness- no adverse reaction       []redness  adverse reaction:       41 min Therapeutic Exercise:  [x] See flow sheet :   Rationale: increase ROM and increase strength to improve the patients ability to improve functional abilities           min Patient Education: [x] Review HEP    [] Progressed/Changed HEP based on:   [] positioning   [] body mechanics   [] transfers   [] heat/ice application        Other Objective/Functional Measures:     Pain Level (0-10 scale) post treatment: 8/10    ASSESSMENT/Changes in Function: Pt presents with continued chief c/o R cervical and proximal UE pain/sxs even with decreased activity level since last tx, but was able to tolerate full normal therex regiment with min to mod challenge today. Will continue to progress/advance patient within current POC as tolerated with monitoring symptoms. Patient will continue to benefit from skilled PT services to modify and progress therapeutic interventions, address functional mobility deficits, address ROM deficits, address strength deficits, analyze and cue movement patterns, analyze and modify body mechanics/ergonomics and assess and modify postural abnormalities to attain remaining goals.      []  See Plan of Care  []  See progress note/recertification  []  See Discharge Summary         Progress towards goals / Updated goals:      PLAN  [x]  Upgrade activities as tolerated     []  Continue plan of care  []  Update interventions per flow sheet       []  Discharge due to:_  []  Other:_      Sam Bravo, PTA 12/18/2017  4:48 PM

## 2017-12-20 ENCOUNTER — HOSPITAL ENCOUNTER (OUTPATIENT)
Dept: PHYSICAL THERAPY | Age: 42
Discharge: HOME | End: 2017-12-20
Payer: COMMERCIAL

## 2017-12-20 PROCEDURE — 97012 MECHANICAL TRACTION THERAPY: CPT

## 2017-12-20 PROCEDURE — 97110 THERAPEUTIC EXERCISES: CPT

## 2017-12-20 NOTE — PROGRESS NOTES
PT DAILY TREATMENT NOTE     Patient Name: Barbara Elder  Date:2017  : 1975  [x]  Patient  Verified  Payor: BLUE CROSS / Plan: [id number] PPO / Product Type: PPO /    In time:4:40  Out time:5:41  Total Treatment Time (min): 61  Total Timed Codes (min): 61  1:1 Treatment Time (min):    Visit #: 15 of 20    Treatment Area: Neck pain [M54.2]  Back pain [M54.9]    SUBJECTIVE  Pain Level (0-10 scale): 7/10  Any medication changes, allergies to medications, adverse drug reactions, diagnosis change, or new procedure performed?: [x] No    [] Yes (see summary sheet for update)  Subjective functional status/changes:   [] No changes reported  My neck and R arm has been pretty much the same over the past few times that I have been here, it usually hurts more when I use my right arm a lot especially with reaching and lifting.     OBJECTIVE  Modality rationale: decrease pain and increase tissue extensibility to improve the patients ability to improve functional abilities   Min Type Additional Details    [] Estim: []Att   []Unatt        []TENS instruct                  []IFC  []Premod   []NMES                     []Other:  []w/US   []w/ice   []w/heat  Position:  Location:   15 [x]  Traction: [x] Cervical       []Lumbar                       [] Prone          [x]Supine                       [x]Intermittent   []Continuous Lbs:14/8  [] before manual  [] after manual    []  Ultrasound: []Continuous   [] Pulsed                           []1MHz   []3MHz Location:  W/cm2:    []  Iontophoresis with dexamethasone         Location: [] Take home patch   [] In clinic    []  Ice     []  heat  []  Ice massage Position:  Location:    []  Vasopneumatic Device Pressure:       [] lo [] med [] hi   Temperature: [] lo [] med [] hi   [] Skin assessment post-treatment:  []intact []redness- no adverse reaction       []redness  adverse reaction:       46 min Therapeutic Exercise:  [x] See flow sheet :   Rationale: increase ROM and increase strength to improve the patients ability to improve functional abilities           min Patient Education: [x] Review HEP    [] Progressed/Changed HEP based on:   [] positioning   [] body mechanics   [] transfers   [] heat/ice application        Other Objective/Functional Measures:     Pain Level (0-10 scale) post treatment: 6/10    ASSESSMENT/Changes in Function: Pt presented with the most improvement with reduction of R UE radicular pain/sxs with trial with mechanical cervical traction at end of therex today with all UE sxs abolished. Will monitor carryover time over the next few txs. Will continue to progress/advance patient within current POC as tolerated with monitoring symptoms. Patient will continue to benefit from skilled PT services to modify and progress therapeutic interventions, address functional mobility deficits, address ROM deficits, address strength deficits, analyze and cue movement patterns, analyze and modify body mechanics/ergonomics and assess and modify postural abnormalities to attain remaining goals.      []  See Plan of Care  []  See progress note/recertification  []  See Discharge Summary         Progress towards goals / Updated goals:      PLAN  [x]  Upgrade activities as tolerated     []  Continue plan of care  []  Update interventions per flow sheet       []  Discharge due to:_  []  Other:_      Asiya Davis, RACHEL 12/20/2017  4:46 PM

## 2017-12-26 ENCOUNTER — HOSPITAL ENCOUNTER (OUTPATIENT)
Dept: PHYSICAL THERAPY | Age: 42
Discharge: HOME | End: 2017-12-26
Payer: COMMERCIAL

## 2017-12-26 PROCEDURE — 97110 THERAPEUTIC EXERCISES: CPT

## 2017-12-26 PROCEDURE — 97012 MECHANICAL TRACTION THERAPY: CPT

## 2017-12-26 NOTE — PROGRESS NOTES
PT DAILY TREATMENT NOTE     Patient Name: Thais Dooley  Date:2017  : 1975  [x]  Patient  Verified  Payor: BLUE CROSS / Plan: [facility] PPO / Product Type: PPO /    In time:9:04  Out time:10:04  Total Treatment Time (min): 60  Total Timed Codes (min): 60  1:1 Treatment Time (min):    Visit #: 16 of 20    Treatment Area: Neck pain [M54.2]  Back pain [M54.9]    SUBJECTIVE  Pain Level (0-10 scale): 7-8/10  Any medication changes, allergies to medications, adverse drug reactions, diagnosis change, or new procedure performed?: [x] No    [] Yes (see summary sheet for update)  Subjective functional status/changes:   [] No changes reported  My neck and arm felt pretty good for about an hour after you did that traction before it started to come back after I was here last time.     OBJECTIVE  Modality rationale: decrease pain and increase tissue extensibility to improve the patients ability to improve functional abilities   Min Type Additional Details    [] Estim: []Att   []Unatt        []TENS instruct                  []IFC  []Premod   []NMES                     []Other:  []w/US   []w/ice   []w/heat  Position:  Location:   15 [x]  Traction: [x] Cervical       []Lumbar                       [] Prone          [x]Supine                       [x]Intermittent   []Continuous Lbs:16/10  [] before manual  [] after manual    []  Ultrasound: []Continuous   [] Pulsed                           []1MHz   []3MHz Location:  W/cm2:    []  Iontophoresis with dexamethasone         Location: [] Take home patch   [] In clinic    []  Ice     []  heat  []  Ice massage Position:  Location:    []  Vasopneumatic Device Pressure:       [] lo [] med [] hi   Temperature: [] lo [] med [] hi   [] Skin assessment post-treatment:  []intact []redness- no adverse reaction       []redness  adverse reaction:       45 min Therapeutic Exercise:  [x] See flow sheet :   Rationale: increase ROM and increase strength to improve the patients ability to improve functional abilities         min Patient Education: [x] Review HEP    [] Progressed/Changed HEP based on:   [] positioning   [] body mechanics   [] transfers   [] heat/ice application        Other Objective/Functional Measures:     Pain Level (0-10 scale) post treatment: 6/10    ASSESSMENT/Changes in Function: Pt reported increased short term relief of R cervical and UE radicular symptoms for up to 1 hour before returning to the same level. Pt was given increased traction tension today with good tolerance, but still demonstrated R UE strength deficits with multiple exercises today. Will continue to progress/advance patient within current POC as tolerated with monitoring symptoms. Patient will continue to benefit from skilled PT services to modify and progress therapeutic interventions, address functional mobility deficits, address ROM deficits, address strength deficits, analyze and address soft tissue restrictions, analyze and cue movement patterns, analyze and modify body mechanics/ergonomics and assess and modify postural abnormalities to attain remaining goals.      []  See Plan of Care  []  See progress note/recertification  []  See Discharge Summary         Progress towards goals / Updated goals:      PLAN  [x]  Upgrade activities as tolerated     []  Continue plan of care  []  Update interventions per flow sheet       []  Discharge due to:_  []  Other:_      Chelsey Saravia, RACHEL 12/26/2017  9:07 AM

## 2017-12-28 ENCOUNTER — APPOINTMENT (OUTPATIENT)
Dept: PHYSICAL THERAPY | Age: 42
End: 2017-12-28
Payer: COMMERCIAL

## 2018-01-02 ENCOUNTER — HOSPITAL ENCOUNTER (OUTPATIENT)
Dept: PHYSICAL THERAPY | Age: 43
Discharge: HOME | End: 2018-01-02
Payer: COMMERCIAL

## 2018-01-02 PROCEDURE — 97110 THERAPEUTIC EXERCISES: CPT

## 2018-01-02 PROCEDURE — 97012 MECHANICAL TRACTION THERAPY: CPT

## 2018-01-02 NOTE — PROGRESS NOTES
PT DAILY TREATMENT NOTE     Patient Name: Elan Dunbar  Date:2018  : 1975  [x]  Patient  Verified  Payor: BLUE CROSS / Plan: [id number] PPO / Product Type: PPO /    In time:8:03  Out time:9:11  Total Treatment Time (min): 68  Total Timed Codes (min): 58  1:1 Treatment Time (min):    Visit #: 17 of 20    Treatment Area: Neck pain [M54.2]  Back pain [M54.9]    SUBJECTIVE  Pain Level (0-10 scale): 7-8/10  Any medication changes, allergies to medications, adverse drug reactions, diagnosis change, or new procedure performed?: [x] No    [] Yes (see summary sheet for update)  Subjective functional status/changes:   [] No changes reported  My neck and left arm is still hurting, it only feels better for about 2 hours at the most after the traction before it goes back to feeling the same.     OBJECTIVE  Modality rationale: decrease pain and increase tissue extensibility to improve the patients ability to improve functional abilities   Min Type Additional Details    [] Estim: []Att   []Unatt        []TENS instruct                  []IFC  []Premod   []NMES                     []Other:  []w/US   []w/ice   []w/heat  Position:  Location:   15 [x]  Traction: [x] Cervical       []Lumbar                       [] Prone          [x]Supine                       [x]Intermittent   []Continuous Lbs: 16/10  [] before manual  [] after manual    []  Ultrasound: []Continuous   [] Pulsed                           []1MHz   []3MHz Location:  W/cm2:    []  Iontophoresis with dexamethasone         Location: [] Take home patch   [] In clinic   10 []  Ice     [x]  heat  []  Ice massage Position:supine  Location:cervical/lumbar    []  Vasopneumatic Device Pressure:       [] lo [] med [] hi   Temperature: [] lo [] med [] hi   [] Skin assessment post-treatment:  []intact []redness- no adverse reaction       []redness  adverse reaction:       43 min Therapeutic Exercise:  [x] See flow sheet :   Rationale: increase ROM and increase strength to improve the patients ability to improve functional abilities       min Patient Education: [x] Review HEP    [] Progressed/Changed HEP based on:   [] positioning   [] body mechanics   [] transfers   [] heat/ice application        Other Objective/Functional Measures:     Pain Level (0-10 scale) post treatment: 6/10    ASSESSMENT/Changes in Function: Patient reports minimal to no improvement with cervical and L UE radicular pain/sxs from therapy since initial evaluation. Pt would benefit from returning to [place] for further diagnostic testing to rule out any possibility of further underlying cervical pathologies limiting improvement. Will continue to progress/advance patient within current POC as tolerated with monitoring symptoms. Patient will continue to benefit from skilled PT services to modify and progress therapeutic interventions, address functional mobility deficits, address ROM deficits, address strength deficits, analyze and cue movement patterns, analyze and modify body mechanics/ergonomics and assess and modify postural abnormalities to attain remaining goals.      []  See Plan of Care  []  See progress note/recertification  []  See Discharge Summary         Progress towards goals / Updated goals:      PLAN  [x]  Upgrade activities as tolerated     []  Continue plan of care  []  Update interventions per flow sheet       []  Discharge due to:_  []  Other:_      Dionna Michaud PTA 1/2/2018  8:06 AM

## 2018-03-01 NOTE — PROGRESS NOTES
[facility] AT [address], [address] [place] [address]  Phone: [phone]   [phone]  DISCHARGE SUMMARY  Patient Name: Laurie Sims : 1975   Treatment/Medical Diagnosis: Neck pain [M54.2]  Back pain [M54.9]   Referral Source: URSULA Alba     Date of Initial Visit: 17 Attended Visits: 17 Missed Visits: 7     SUMMARY OF TREATMENT  Pt was seen on 17 for an initial evaluation for C/S and L/S pain. Pt was seen for 12 visits and therapy has included: therapeutic exercise, manual therapy, modalities for pain control, patient education and HEP. CURRENT STATUS  Patient attended 17 therapy visits, and seemed to plateau with progress- reporting 7-8/10 neck pain and only short term relief after therapy. Most recent measurements noted below:    C/S AROM decreased by 50% in all directions, L/S AROM WNL in all directions     New Goals to be achieved in __8__  treatments:  1.  Pt to report +5 or > on GROC to improve functional mobility for ADLs- will assess at DC   2.  Pt to demonstrate l/s and c/s  AROM WFL to improve functional mobility for ADLs- goal progressing   3.  Pt will report 75% overall improvement in functional ADL's in order to return to PLOF. - goal met   4. Patient will be independent with long term HEP in order to prepare for DC to home. - will assess at        RECOMMENDATIONS  Discharge from therapy. Patient's progress plateaued and she did not return after 18. If you have any questions/comments please contact us directly at [phone]. Thank you for allowing us to assist in the care of your patient.     Therapist Signature: Steven Tracy PT, [facility] Date: 3/1/18     Time: 11:59 AM

## 2020-05-09 ENCOUNTER — HOSPITAL ENCOUNTER (EMERGENCY)
Age: 45
Discharge: HOME | End: 2020-05-09
Attending: EMERGENCY MEDICINE | Admitting: EMERGENCY MEDICINE

## 2020-05-09 VITALS
RESPIRATION RATE: 16 BRPM | DIASTOLIC BLOOD PRESSURE: 81 MMHG | HEART RATE: 91 BPM | TEMPERATURE: 98.7 F | SYSTOLIC BLOOD PRESSURE: 119 MMHG | OXYGEN SATURATION: 97 %

## 2020-05-09 DIAGNOSIS — R51.9 NONINTRACTABLE HEADACHE, UNSPECIFIED CHRONICITY PATTERN, UNSPECIFIED HEADACHE TYPE: ICD-10-CM

## 2020-05-09 DIAGNOSIS — Z20.822 SUSPECTED COVID-19 VIRUS INFECTION: Primary | ICD-10-CM

## 2020-05-09 PROCEDURE — 87635 SARS-COV-2 COVID-19 AMP PRB: CPT

## 2020-05-09 PROCEDURE — 99282 EMERGENCY DEPT VISIT SF MDM: CPT

## 2020-05-09 NOTE — DISCHARGE INSTRUCTIONS
Patient Education        Viral Infections: Care Instructions  Your Care Instructions    You don't feel well, but it's not clear what's causing it. You may have a viral infection. Viruses cause many illnesses, such as the common cold, influenza, fever, rashes, and the diarrhea, nausea, and vomiting that are often called \"stomach flu. \" You may wonder if antibiotic medicines could make you feel better. But antibiotics only treat infections caused by bacteria. They don't work on viruses. The good news is that viral infections usually aren't serious. Most will go away in a few days without medical treatment. In the meantime, there are a few things you can do to make yourself more comfortable. Follow-up care is a key part of your treatment and safety. Be sure to make and go to all appointments, and call your doctor if you are having problems. It's also a good idea to know your test results and keep a list of the medicines you take. How can you care for yourself at home? · Get plenty of rest if you feel tired. · Take an over-the-counter pain medicine if needed, such as acetaminophen (Tylenol), ibuprofen (Advil, Motrin), or naproxen (Aleve). Read and follow all instructions on the label. · Be careful when taking over-the-counter cold or flu medicines and Tylenol at the same time. Many of these medicines have acetaminophen, which is Tylenol. Read the labels to make sure that you are not taking more than the recommended dose. Too much acetaminophen (Tylenol) can be harmful. · Drink plenty of fluids, enough so that your urine is light yellow or clear like water. If you have kidney, heart, or liver disease and have to limit fluids, talk with your doctor before you increase the amount of fluids you drink. · Stay home from work, school, and other public places while you have a fever. When should you call for help? Call 911 anytime you think you may need emergency care.  For example, call if:    · You have severe trouble breathing.     · You passed out (lost consciousness).    Call your doctor now or seek immediate medical care if:    · You seem to be getting much sicker.     · You have a new or higher fever.     · You have blood in your stools.     · You have new belly pain, or your pain gets worse.     · You have a new rash.    Watch closely for changes in your health, and be sure to contact your doctor if:    · You start to get better and then get worse.     · You do not get better as expected. Where can you learn more? Go to http://hayes-luis felipe.info/  Enter L906 in the search box to learn more about \"Viral Infections: Care Instructions. \"  Current as of: January 26, 2020Content Version: 12.4  © 1523-3382 [employer]. Care instructions adapted under license by [employer] (which disclaims liability or warranty for this information). If you have questions about a medical condition or this instruction, always ask your healthcare professional. [address] any warranty or liability for your use of this information.

## 2020-05-09 NOTE — LETTER
[facility] EMERGENCY DEPT 
[address] [facility] [id number] [phone] Work/School Note Date: 5/9/2020 To Whom It May concern: 
 
Clarice Ty was seen and treated today in the emergency room by the following provider(s): 
Attending Provider: Vito Liriano MD.   
 
Clarice Ty may return to work on 5/12/20 or sooner if cleared by occupational health.  
 
Sincerely, 
 
 
 
 
Maxi Victor MD

## 2020-05-11 ENCOUNTER — PATIENT OUTREACH (OUTPATIENT)
Dept: CASE MANAGEMENT | Age: 45
End: 2020-05-11

## 2020-05-11 NOTE — PROGRESS NOTES
MSW has been assigned to follow up with the patient for Covid care post recent ED visit. MSW attempted to contact the patient and call was transferred to voicemail. MSW left message with introduction of self, role, reason for call and provided with call back information. MSW will attempt to contact the patient at a later time.

## 2020-05-12 ENCOUNTER — PATIENT OUTREACH (OUTPATIENT)
Dept: CASE MANAGEMENT | Age: 45
End: 2020-05-12

## 2020-05-12 LAB — SARS-COV-2, COV2NT: NOT DETECTED

## 2020-05-12 NOTE — PROGRESS NOTES
MSW attempted to contact the patient to complete initial follow up call post recent ED visit. This has been the second attempt made. MSW left message on voicemail with introduction of self, role, reason for call and provided call back information. MSW will discontinue follow up calls at this time.

## 2020-06-08 NOTE — ED PROVIDER NOTES
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 She notes some mild congestion,mild HA, fever, and abdominal pain that is poorly localized. She denies any aggravating or alleviating factors. She has been eating and works in an environment that had a cluster of [place] but denies any family sick contacts. PCP: Meredith Longo NP        Past History     Past Medical History:  No past medical history on file. Past Surgical History:  No past surgical history on file. Family History:  No family history on file. Social History:  Social History     Tobacco Use    Smoking status: Not on file   Substance Use Topics    Alcohol use: Not on file    Drug use: Not on file       Allergies: Allergies   Allergen Reactions    Latex, Natural Rubber Rash and Swelling    Codeine Rash and Swelling    Milk Containing Products Nausea and Vomiting    Wheat Other (comments)     Celiacs         Review of Systems       Review of Systems   Constitutional: Positive for fever. Negative for activity change and fatigue. HENT: Positive for congestion. Negative for rhinorrhea. Eyes: Negative for visual disturbance. Respiratory: Negative for shortness of breath. Cardiovascular: Negative for chest pain and palpitations. Gastrointestinal: Negative for abdominal pain, diarrhea, nausea and vomiting. Genitourinary: Negative for dysuria and hematuria. Musculoskeletal: Negative for back pain. Skin: Negative for rash. Neurological: Positive for headaches. Negative for dizziness, weakness and light-headedness.    All other systems reviewed and are negative. Physical Exam     Visit Vitals  /81 (BP 1 Location: Right arm, BP Patient Position: At rest;Sitting)   Pulse 91   Temp 98.7 °F (37.1 °C)   Resp 16   SpO2 97%         Physical Exam  Vitals signs and nursing note reviewed. Constitutional:       General: She is not in acute distress. Appearance: She is well-developed. HENT:      Head: Normocephalic and atraumatic. Right Ear: External ear normal.      Left Ear: External ear normal.      Nose: Nose normal.   Eyes:      General: No scleral icterus. Conjunctiva/sclera: Conjunctivae normal.      Pupils: Pupils are equal, round, and reactive to light. Neck:      Musculoskeletal: Normal range of motion. Thyroid: No thyromegaly. Vascular: No JVD. Trachea: No tracheal deviation. Cardiovascular:      Rate and Rhythm: Normal rate and regular rhythm. Heart sounds: Normal heart sounds. Pulmonary:      Effort: Pulmonary effort is normal.      Breath sounds: Normal breath sounds. Chest:      Chest wall: No tenderness. Abdominal:      Palpations: Abdomen is soft. Musculoskeletal: Normal range of motion. Lymphadenopathy:      Cervical: No cervical adenopathy. Skin:     General: Skin is warm and dry. Neurological:      Mental Status: She is alert and oriented to person, place, and time. Cranial Nerves: No cranial nerve deficit. Coordination: Coordination normal.      Comments:  Motor 5/5   Psychiatric:         Behavior: Behavior normal.         Thought Content: Thought content normal.         Judgment: Judgment normal.      Comments: Evaluated in her car with family present           Diagnostic Study Results     Labs -  No results found for this or any previous visit (from the past 12 hour(s)). Radiologic Studies -   No orders to display         Medical Decision Making   I am the first provider for this patient.     I reviewed the vital signs, available nursing notes, past medical history, past surgical history, family history and social history. Vital Signs-Reviewed the patient's vital signs. 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 The patient has no distress,exam is reassuring, and is nontoxic appearing. She is afebrile at the time of evaluation. Will send COVID testing and have encouraged her to follow with her PMD and to return if there is any worsening fever, cough, congestion, any change in diet, increase in pain, or at all concerned. Re Villagran,        Procedures      Diagnosis     Clinical Impression:   1. Suspected COVID-19 virus infection    2. Nonintractable headache, unspecified chronicity pattern, unspecified headache type        Disposition: DC    Follow-up Information     Follow up With Specialties Details Why Contact Info    Lin Jones NP Nurse Practitioner In 2 days  [address] [place] [address] [address]  [address]  [address]      [facility] EMERGENCY DEPT Emergency Medicine  As needed, If symptoms worsen [address] [place] [address] [address] [id number]  [phone]           There are no discharge medications for this patient. Disclaimer: Sections of this note are dictated using utilizing voice recognition software. Minor typographical errors may be present. If questions arise, please do not hesitate to contact me or call our department.

## 2021-04-28 PROBLEM — G03.9 MENINGITIS: Status: ACTIVE | Noted: 2021-04-28
